# Patient Record
Sex: FEMALE | Race: WHITE | URBAN - METROPOLITAN AREA
[De-identification: names, ages, dates, MRNs, and addresses within clinical notes are randomized per-mention and may not be internally consistent; named-entity substitution may affect disease eponyms.]

---

## 2019-08-08 ENCOUNTER — TELEPHONE (OUTPATIENT)
Dept: INTERNAL MEDICINE CLINIC | Age: 19
End: 2019-08-08

## 2019-08-08 NOTE — TELEPHONE ENCOUNTER
PS[facility] called & set up new patient appointment with Dr. Kristofer Dial for Monday, November 25, 2019 11:00 AM. Patient was very thankful for call & appointment.

## 2019-11-25 ENCOUNTER — HOSPITAL ENCOUNTER (OUTPATIENT)
Dept: LAB | Age: 19
Discharge: HOME | End: 2019-11-25

## 2019-11-25 ENCOUNTER — OFFICE VISIT (OUTPATIENT)
Dept: INTERNAL MEDICINE CLINIC | Age: 19
End: 2019-11-25

## 2019-11-25 VITALS
HEIGHT: 60 IN | WEIGHT: 113 LBS | TEMPERATURE: 98.6 F | OXYGEN SATURATION: 98 % | RESPIRATION RATE: 16 BRPM | HEART RATE: 84 BPM | BODY MASS INDEX: 22.19 KG/M2 | SYSTOLIC BLOOD PRESSURE: 100 MMHG | DIASTOLIC BLOOD PRESSURE: 68 MMHG

## 2019-11-25 DIAGNOSIS — Z00.00 ROUTINE GENERAL MEDICAL EXAMINATION AT A HEALTH CARE FACILITY: Primary | ICD-10-CM

## 2019-11-25 DIAGNOSIS — Z00.00 ROUTINE GENERAL MEDICAL EXAMINATION AT A HEALTH CARE FACILITY: ICD-10-CM

## 2019-11-25 LAB
ALBUMIN SERPL-MCNC: 4.3 G/DL (ref 3.5–5)
ALBUMIN/GLOB SERPL: 1.2 {RATIO} (ref 1.1–2.2)
ALP SERPL-CCNC: 79 U/L (ref 45–117)
ALT SERPL-CCNC: 52 U/L (ref 12–78)
ANION GAP SERPL CALC-SCNC: 6 MMOL/L (ref 5–15)
AST SERPL-CCNC: 42 U/L (ref 15–37)
BILIRUB SERPL-MCNC: 0.6 MG/DL (ref 0.2–1)
BUN SERPL-MCNC: 11 MG/DL (ref 6–20)
BUN/CREAT SERPL: 13 (ref 12–20)
CALCIUM SERPL-MCNC: 9.3 MG/DL (ref 8.5–10.1)
CHLORIDE SERPL-SCNC: 105 MMOL/L (ref 97–108)
CHOLEST SERPL-MCNC: 259 MG/DL
CO2 SERPL-SCNC: 27 MMOL/L (ref 21–32)
CREAT SERPL-MCNC: 0.83 MG/DL (ref 0.55–1.02)
ERYTHROCYTE [DISTWIDTH] IN BLOOD BY AUTOMATED COUNT: 11.9 % (ref 11.5–14.5)
GLOBULIN SER CALC-MCNC: 3.6 G/DL (ref 2–4)
GLUCOSE SERPL-MCNC: 84 MG/DL (ref 65–100)
HCT VFR BLD AUTO: 45.5 % (ref 35–47)
HDLC SERPL-MCNC: 107 MG/DL
HDLC SERPL: 2.4 {RATIO} (ref 0–5)
HGB BLD-MCNC: 14.7 G/DL (ref 11.5–16)
LDLC SERPL CALC-MCNC: 127.4 MG/DL (ref 0–100)
LIPID PROFILE,FLP: ABNORMAL
MCH RBC QN AUTO: 29.9 PG (ref 26–34)
MCHC RBC AUTO-ENTMCNC: 32.3 G/DL (ref 30–36.5)
MCV RBC AUTO: 92.5 FL (ref 80–99)
NRBC # BLD: 0 K/UL (ref 0–0.01)
NRBC BLD-RTO: 0 PER 100 WBC
PLATELET # BLD AUTO: 265 K/UL (ref 150–400)
PMV BLD AUTO: 11.4 FL (ref 8.9–12.9)
POTASSIUM SERPL-SCNC: 4.1 MMOL/L (ref 3.5–5.1)
PROT SERPL-MCNC: 7.9 G/DL (ref 6.4–8.2)
RBC # BLD AUTO: 4.92 M/UL (ref 3.8–5.2)
SODIUM SERPL-SCNC: 138 MMOL/L (ref 136–145)
TRIGL SERPL-MCNC: 123 MG/DL (ref ?–150)
TSH SERPL DL<=0.05 MIU/L-ACNC: 0.56 UIU/ML (ref 0.36–3.74)
VLDLC SERPL CALC-MCNC: 24.6 MG/DL
WBC # BLD AUTO: 7 K/UL (ref 3.6–11)

## 2019-11-25 RX ORDER — ETHYNODIOL DIACETATE AND ETHINYL ESTRADIOL 1 MG-35MCG
KIT ORAL
COMMUNITY

## 2019-11-25 NOTE — PROGRESS NOTES
HISTORY OF PRESENT ILLNESS  June Horse is a 22 y.o. female. HPI     Two times a week has  - kickboxing ; occasionally will do a run     Her new major is ; she is doing well    She has recurrent strept and will make an appt to get her ENT ; she may think about it with next summer    Getting allergy shots at school     Review of Systems   Constitutional: Negative. Negative for chills, diaphoresis, fever, malaise/fatigue and weight loss. HENT: Negative for congestion, nosebleeds and tinnitus. Eyes: Negative for blurred vision, double vision and photophobia. Respiratory: Negative for cough, hemoptysis, sputum production, shortness of breath and wheezing. Cardiovascular: Negative for chest pain, palpitations, orthopnea, claudication, leg swelling and PND. Gastrointestinal: Negative for abdominal pain, blood in stool, constipation, diarrhea, heartburn, melena, nausea and vomiting. Genitourinary: Negative for dysuria, frequency, hematuria and urgency. Musculoskeletal: Negative for back pain, joint pain, myalgias and neck pain. Skin: Negative for itching and rash. Neurological: Negative for dizziness, tingling, sensory change, speech change, focal weakness, weakness and headaches. Endo/Heme/Allergies: Negative for polydipsia. Does not bruise/bleed easily. Psychiatric/Behavioral: Negative for depression. The patient is not nervous/anxious and does not have insomnia. Physical Exam  Vitals signs and nursing note reviewed. Constitutional:       Appearance: She is well-developed. HENT:      Head: Normocephalic and atraumatic. Right Ear: External ear normal.      Left Ear: External ear normal.      Nose: Nose normal.   Neck:      Musculoskeletal: Normal range of motion and neck supple. Thyroid: No thyroid mass or thyromegaly. Vascular: No carotid bruit or JVD. Cardiovascular:      Rate and Rhythm: Normal rate and regular rhythm. Pulses: Normal pulses. Heart sounds: Normal heart sounds, S1 normal and S2 normal. No murmur. No friction rub. No gallop. Pulmonary:      Effort: Pulmonary effort is normal.      Breath sounds: Normal breath sounds. Abdominal:      General: Bowel sounds are normal.      Palpations: Abdomen is soft. Musculoskeletal: Normal range of motion. Skin:     General: Skin is warm and dry. Neurological:      Mental Status: She is alert and oriented to person, place, and time. Psychiatric:         Behavior: Behavior normal.         Thought Content: Thought content normal.         Judgment: Judgment normal.         ASSESSMENT and PLAN  Diagnoses and all orders for this visit:    1. Routine general medical examination at a health care facility  -     CBC W/O DIFF; Future  -     LIPID PANEL; Future  -     METABOLIC PANEL, COMPREHENSIVE; Future  -     TSH 3RD GENERATION; Future    This note will not be viewable in [employer] [employer].         lab results and schedule of future lab studies reviewed with patient  reviewed diet, exercise and weight control  cardiovascular risk and specific lipid/LDL goals reviewed  reviewed medications and side effects in detail

## 2020-10-14 ENCOUNTER — TELEPHONE (OUTPATIENT)
Dept: INTERNAL MEDICINE CLINIC | Age: 20
End: 2020-10-14

## 2020-10-15 ENCOUNTER — OFFICE VISIT (OUTPATIENT)
Dept: INTERNAL MEDICINE CLINIC | Age: 20
End: 2020-10-15
Payer: COMMERCIAL

## 2020-10-15 VITALS
TEMPERATURE: 98.4 F | RESPIRATION RATE: 18 BRPM | DIASTOLIC BLOOD PRESSURE: 74 MMHG | WEIGHT: 114.2 LBS | SYSTOLIC BLOOD PRESSURE: 110 MMHG | HEART RATE: 72 BPM | BODY MASS INDEX: 22.3 KG/M2 | OXYGEN SATURATION: 100 %

## 2020-10-15 DIAGNOSIS — N39.0 URINARY TRACT INFECTION WITHOUT HEMATURIA, SITE UNSPECIFIED: Primary | ICD-10-CM

## 2020-10-15 LAB
BILIRUB UR QL STRIP: NEGATIVE
GLUCOSE UR-MCNC: NEGATIVE MG/DL
KETONES P FAST UR STRIP-MCNC: NEGATIVE MG/DL
PH UR STRIP: 6.5 [PH] (ref 4.6–8)
PROT UR QL STRIP: NEGATIVE
SP GR UR STRIP: 1.01 (ref 1–1.03)
UA UROBILINOGEN AMB POC: NORMAL (ref 0.2–1)
URINALYSIS CLARITY POC: NORMAL
URINALYSIS COLOR POC: NORMAL
URINE BLOOD POC: NEGATIVE
URINE LEUKOCYTES POC: NEGATIVE
URINE NITRITES POC: NEGATIVE

## 2020-10-15 PROCEDURE — 99214 OFFICE O/P EST MOD 30 MIN: CPT | Performed by: INTERNAL MEDICINE

## 2020-10-15 PROCEDURE — 81001 URINALYSIS AUTO W/SCOPE: CPT | Performed by: INTERNAL MEDICINE

## 2020-10-15 RX ORDER — CEPHALEXIN 250 MG/1
250 CAPSULE ORAL
COMMUNITY
Start: 2020-10-13

## 2020-10-15 NOTE — PROGRESS NOTES
HISTORY OF PRESENT ILLNESS  Aliyah Casiano is a 22 y.o. female. HPI  Pt reports that on Monday she woke up and had a UTI (hematuria). She notes that throughout the day she developed flu-like sxs and went to the [facility]. She then went to the ED due to concern that the infection had spread to her kidneys. Pt's CT did not show kidney stones. Pt was informed that she had a severe UTI and was treated with IV Keflex. UTI: Pt now continues on oral Keflex four times a day. She notes that she has some back pain today but is feeling much better. Pt states that she followed with GYN today and was informed that her cyst is not cause for concern. Pt states that she is sexually active. Of note, pt had COVID in the second week of school. Review of Systems   Gastrointestinal: Positive for abdominal pain (RLQ). Musculoskeletal: Positive for back pain. All other systems reviewed and are negative. Physical Exam  Constitutional:       Appearance: Normal appearance. HENT:      Right Ear: Hearing, tympanic membrane and external ear normal.      Left Ear: Hearing, tympanic membrane and external ear normal.      Mouth/Throat:      Mouth: Mucous membranes are moist.      Pharynx: Oropharynx is clear. Cardiovascular:      Rate and Rhythm: Normal rate and regular rhythm. Pulses: Normal pulses. Heart sounds: Normal heart sounds. Pulmonary:      Effort: Pulmonary effort is normal.      Breath sounds: Normal breath sounds and air entry. Abdominal:      Tenderness: There is abdominal tenderness (RLQ). Musculoskeletal: Normal range of motion. Skin:     General: Skin is warm and dry. Neurological:      General: No focal deficit present. Mental Status: She is alert and oriented to person, place, and time. Psychiatric:         Mood and Affect: Mood normal.         Behavior: Behavior normal.         ASSESSMENT and PLAN  Diagnoses and all orders for this visit:    1. Urinary tract infection without hematuria, site unspecified  Stable and improving. Asked pt to give urine sample to assess if there is still bacteria in her urinary tract. Pt agreed. Ordered urinalysis and waiting on culture results. Had conversation with pt about causes of UTI's and informed her that some people are just more prone to developing them. Pt states that her GYN Rx Macrobid for her to take if she feels like she is developing a UTI in the future. Will continue to monitor for improvements or changes. Lab results and schedule of future lab studies reviewed with patient. Reviewed diet, exercise and weight control. Written by Eddy Tellez, as dictated by Mello Huerta MD.     Current diagnosis and concerns discussed with pt at length. Understands risks and benefits or current treatment plan and medications and accepts the treatment and medication with any possible risks. Pt asks appropriate questions which were answered. Pt instructed to call with any concerns or problems.

## 2020-10-16 LAB
BACTERIA SPEC CULT: NORMAL
SERVICE CMNT-IMP: NORMAL

## 2021-11-02 NOTE — PROGRESS NOTES
Kala Lerner (: 2000) is a 23 y.o. female, established patient, here for evaluation of the following chief complaint(s):  Complete Physical       ASSESSMENT/PLAN:  Below is the assessment and plan developed based on review of pertinent history, physical exam, labs, studies, and medications. 1. 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 Physical Exam  Constitutional:       Appearance: Normal appearance. HENT:      Right Ear: Tympanic membrane and external ear normal.      Left Ear: Tympanic membrane and external ear normal.      Mouth/Throat:      Mouth: Mucous membranes are moist.      Pharynx: Oropharynx is clear. Cardiovascular:      Rate and Rhythm: Normal rate and regular rhythm. Pulses: Normal pulses. Heart sounds: Normal heart sounds. Pulmonary:      Effort: Pulmonary effort is normal.      Breath sounds: Normal breath sounds. Musculoskeletal:         General: Normal range of motion. Skin:     General: Skin is warm and dry. Neurological:      General: No focal deficit present. Mental Status: She is alert and oriented to person, place, and time.    Psychiatric:         Mood and Affect: Mood normal.         Behavior: Behavior normal.     On this date 2021 I have spent 30 minutes reviewing previous notes, test results and face to face with the patient discussing the diagnosis and importance of compliance with the treatment plan as well as documenting on the day of the visit. An electronic signature was used to authenticate this note.

## 2021-11-03 ENCOUNTER — OFFICE VISIT (OUTPATIENT)
Dept: INTERNAL MEDICINE CLINIC | Age: 21
End: 2021-11-03
Payer: COMMERCIAL

## 2021-11-03 VITALS
TEMPERATURE: 98.3 F | HEART RATE: 66 BPM | RESPIRATION RATE: 16 BRPM | SYSTOLIC BLOOD PRESSURE: 91 MMHG | OXYGEN SATURATION: 99 % | WEIGHT: 109.2 LBS | BODY MASS INDEX: 21.44 KG/M2 | DIASTOLIC BLOOD PRESSURE: 58 MMHG | HEIGHT: 60 IN

## 2021-11-03 DIAGNOSIS — Z00.00 ROUTINE GENERAL MEDICAL EXAMINATION AT A HEALTH CARE FACILITY: Primary | ICD-10-CM

## 2021-11-03 PROCEDURE — 99395 PREV VISIT EST AGE 18-39: CPT | Performed by: INTERNAL MEDICINE

## 2023-02-21 NOTE — PROGRESS NOTES
Myrene Meigs (: 2000) is a 23 y.o. female, established patient, here for evaluation of the following chief complaint(s):  No chief complaint on file. ASSESSMENT/PLAN:  Below is the assessment and plan developed based on review of pertinent history, physical exam, labs, studies, and medications. 1. Routine general medical examination at a health care facility    No follow-ups on file. Defer on labs this year and can do it next year   She does exercise and eating right , mood is good    SUBJECTIVE/OBJECTIVE:  HPI    Here for a physical. Feeling well and doing well; feeling well and has an IUD now and every 6-8 months her ovulation is bad and her abdominal pain gets worse   Eating healthy- joined [employer] pil[employer] in [place]   Saw  and got a Pap     Review of Systems   All other systems reviewed and are negative. Physical Exam  Vitals and nursing note reviewed. Constitutional:       Appearance: Normal appearance. She is well-developed. HENT:      Head: Normocephalic and atraumatic. Right Ear: External ear normal.      Left Ear: External ear normal.      Nose: Nose normal.   Eyes:      Conjunctiva/sclera: Conjunctivae normal.      Pupils: Pupils are equal, round, and reactive to light. Neck:      Thyroid: No thyromegaly. Vascular: No carotid bruit. Cardiovascular:      Rate and Rhythm: Normal rate and regular rhythm. Heart sounds: Normal heart sounds, S1 normal and S2 normal.   Pulmonary:      Effort: Pulmonary effort is normal.      Breath sounds: Normal breath sounds. Abdominal:      General: Bowel sounds are normal.      Palpations: Abdomen is soft. Tenderness: There is no abdominal tenderness. Musculoskeletal:         General: Normal range of motion. Cervical back: Normal range of motion and neck supple. Skin:     General: Skin is warm and dry. Neurological:      Mental Status: She is alert and oriented to person, place, and time. Psychiatric:         Behavior: Behavior normal.         Thought Content: Thought content normal.         Judgment: Judgment normal.         On this date 02/22/2023 I have spent 30 minutes reviewing previous notes, test results and face to face with the patient discussing the diagnosis and importance of compliance with the treatment plan as well as documenting on the day of the visit. An electronic signature was used to authenticate this note.   -- Araceli Ganser, MD

## 2023-02-22 ENCOUNTER — OFFICE VISIT (OUTPATIENT)
Dept: INTERNAL MEDICINE CLINIC | Age: 23
End: 2023-02-22
Payer: COMMERCIAL

## 2023-02-22 VITALS
HEART RATE: 68 BPM | WEIGHT: 108 LBS | RESPIRATION RATE: 16 BRPM | HEIGHT: 61 IN | TEMPERATURE: 98.2 F | OXYGEN SATURATION: 98 % | DIASTOLIC BLOOD PRESSURE: 67 MMHG | SYSTOLIC BLOOD PRESSURE: 97 MMHG | BODY MASS INDEX: 20.39 KG/M2

## 2023-02-22 DIAGNOSIS — Z00.00 ROUTINE GENERAL MEDICAL EXAMINATION AT A HEALTH CARE FACILITY: Primary | ICD-10-CM

## 2023-02-22 PROCEDURE — 99395 PREV VISIT EST AGE 18-39: CPT | Performed by: INTERNAL MEDICINE

## 2023-02-22 RX ORDER — SPIRONOLACTONE 25 MG/1
TABLET ORAL
COMMUNITY
Start: 2023-01-20

## 2023-05-24 RX ORDER — CEPHALEXIN 250 MG/1
250 CAPSULE ORAL
COMMUNITY
Start: 2020-10-13

## 2023-05-24 RX ORDER — ETHYNODIOL DIACETATE AND ETHINYL ESTRADIOL 1 MG-35MCG
KIT ORAL
COMMUNITY

## 2023-05-24 RX ORDER — SPIRONOLACTONE 25 MG/1
TABLET ORAL
COMMUNITY
Start: 2023-01-20

## 2023-10-19 ENCOUNTER — OFFICE VISIT (OUTPATIENT)
Age: 23
End: 2023-10-19
Payer: COMMERCIAL

## 2023-10-19 VITALS
WEIGHT: 107 LBS | HEIGHT: 60 IN | DIASTOLIC BLOOD PRESSURE: 64 MMHG | BODY MASS INDEX: 21.01 KG/M2 | RESPIRATION RATE: 16 BRPM | TEMPERATURE: 98 F | HEART RATE: 67 BPM | OXYGEN SATURATION: 98 % | SYSTOLIC BLOOD PRESSURE: 94 MMHG

## 2023-10-19 DIAGNOSIS — J02.8 PHARYNGITIS DUE TO OTHER ORGANISM: Primary | ICD-10-CM

## 2023-10-19 LAB
GROUP A STREP ANTIGEN, POC: NEGATIVE
VALID INTERNAL CONTROL, POC: NORMAL

## 2023-10-19 PROCEDURE — 99213 OFFICE O/P EST LOW 20 MIN: CPT | Performed by: INTERNAL MEDICINE

## 2023-10-19 PROCEDURE — 87880 STREP A ASSAY W/OPTIC: CPT | Performed by: INTERNAL MEDICINE

## 2023-10-19 RX ORDER — CEFUROXIME AXETIL 250 MG/1
250 TABLET ORAL 2 TIMES DAILY
Qty: 20 TABLET | Refills: 0 | Status: SHIPPED | OUTPATIENT
Start: 2023-10-19 | End: 2023-10-29

## 2023-10-19 SDOH — ECONOMIC STABILITY: FOOD INSECURITY: WITHIN THE PAST 12 MONTHS, THE FOOD YOU BOUGHT JUST DIDN'T LAST AND YOU DIDN'T HAVE MONEY TO GET MORE.: NEVER TRUE

## 2023-10-19 SDOH — ECONOMIC STABILITY: HOUSING INSECURITY
IN THE LAST 12 MONTHS, WAS THERE A TIME WHEN YOU DID NOT HAVE A STEADY PLACE TO SLEEP OR SLEPT IN A SHELTER (INCLUDING NOW)?: NO

## 2023-10-19 SDOH — ECONOMIC STABILITY: INCOME INSECURITY: HOW HARD IS IT FOR YOU TO PAY FOR THE VERY BASICS LIKE FOOD, HOUSING, MEDICAL CARE, AND HEATING?: NOT HARD AT ALL

## 2023-10-19 SDOH — ECONOMIC STABILITY: FOOD INSECURITY: WITHIN THE PAST 12 MONTHS, YOU WORRIED THAT YOUR FOOD WOULD RUN OUT BEFORE YOU GOT MONEY TO BUY MORE.: NEVER TRUE

## 2023-10-19 ASSESSMENT — PATIENT HEALTH QUESTIONNAIRE - PHQ9
SUM OF ALL RESPONSES TO PHQ QUESTIONS 1-9: 0
SUM OF ALL RESPONSES TO PHQ QUESTIONS 1-9: 0
2. FEELING DOWN, DEPRESSED OR HOPELESS: 0
SUM OF ALL RESPONSES TO PHQ QUESTIONS 1-9: 0
1. LITTLE INTEREST OR PLEASURE IN DOING THINGS: 0
SUM OF ALL RESPONSES TO PHQ9 QUESTIONS 1 & 2: 0
SUM OF ALL RESPONSES TO PHQ QUESTIONS 1-9: 0

## 2023-10-19 NOTE — PROGRESS NOTES
Gabriel Go (:  2000) is a 23 y.o. female,Established patient, here for evaluation of the following chief complaint(s):  Sore Throat (Went to urgent care on Monday when sx started. Pt was told that it was viral infection, took dayquil and nyquil, sx got worse. )         ASSESSMENT/PLAN:  1. Pharyngitis due to other organism  -     cefUROXime (CEFTIN) 250 MG tablet; Take 1 tablet by mouth 2 times daily for 10 days, Disp-20 tablet, R-0Normal  -     AMB POC RAPID STREP A    Strep negative but I am concerned given the degree of erythema and some anterior lymph nodes and some chills we will treat  No follow-ups on file. Subjective   SUBJECTIVE/OBJECTIVE:  HPI  Went to  had sore throat and negative- started Monday morning with sore throat and went to [employer] - had covid in August     Review of Systems   All other systems reviewed and are negative. Objective   Physical Exam  Vitals and nursing note reviewed. Constitutional:       Appearance: Normal appearance. HENT:      Head: Normocephalic and atraumatic. Comments: Erythema of tonsils, no posterior LAD, no exudate      Right Ear: Tympanic membrane and external ear normal.      Left Ear: Tympanic membrane and external ear normal.      Nose: Nose normal.      Mouth/Throat:      Mouth: Mucous membranes are moist.   Eyes:      Extraocular Movements: Extraocular movements intact. Conjunctiva/sclera: Conjunctivae normal.   Cardiovascular:      Rate and Rhythm: Normal rate and regular rhythm. Pulmonary:      Effort: Pulmonary effort is normal.      Breath sounds: Normal breath sounds. Abdominal:      General: Bowel sounds are normal.      Palpations: Abdomen is soft. Musculoskeletal:         General: Normal range of motion. Cervical back: Normal range of motion. Skin:     General: Skin is warm. Neurological:      General: No focal deficit present.       Mental Status: She is alert and oriented to person,

## 2024-08-08 ENCOUNTER — HOSPITAL ENCOUNTER (OUTPATIENT)
Facility: HOSPITAL | Age: 24
Discharge: HOME | End: 2024-08-08
Attending: INTERNAL MEDICINE
Payer: COMMERCIAL

## 2024-08-08 ENCOUNTER — OFFICE VISIT (OUTPATIENT)
Age: 24
End: 2024-08-08
Payer: COMMERCIAL

## 2024-08-08 VITALS
HEART RATE: 66 BPM | HEIGHT: 60 IN | TEMPERATURE: 98.4 F | RESPIRATION RATE: 16 BRPM | BODY MASS INDEX: 21.95 KG/M2 | OXYGEN SATURATION: 99 % | SYSTOLIC BLOOD PRESSURE: 112 MMHG | DIASTOLIC BLOOD PRESSURE: 78 MMHG | WEIGHT: 111.8 LBS

## 2024-08-08 DIAGNOSIS — R10.30 LOWER ABDOMINAL PAIN: ICD-10-CM

## 2024-08-08 DIAGNOSIS — K62.5 RECTAL BLEEDING: ICD-10-CM

## 2024-08-08 DIAGNOSIS — R19.5 INCREASED MUCUS IN STOOL: ICD-10-CM

## 2024-08-08 DIAGNOSIS — R10.30 LOWER ABDOMINAL PAIN: Primary | ICD-10-CM

## 2024-08-08 PROCEDURE — 99214 OFFICE O/P EST MOD 30 MIN: CPT | Performed by: INTERNAL MEDICINE

## 2024-08-08 PROCEDURE — 74177 CT ABD & PELVIS W/CONTRAST: CPT

## 2024-08-08 PROCEDURE — 6360000004 HC RX CONTRAST MEDICATION: Performed by: INTERNAL MEDICINE

## 2024-08-08 RX ORDER — CIPROFLOXACIN 500 MG/1
500 TABLET, FILM COATED ORAL 2 TIMES DAILY
Qty: 20 TABLET | Refills: 0 | Status: SHIPPED | OUTPATIENT
Start: 2024-08-08 | End: 2024-08-18

## 2024-08-08 RX ADMIN — IOPAMIDOL 100 ML: 755 INJECTION, SOLUTION INTRAVENOUS at 17:28

## 2024-08-08 ASSESSMENT — PATIENT HEALTH QUESTIONNAIRE - PHQ9
SUM OF ALL RESPONSES TO PHQ QUESTIONS 1-9: 0
2. FEELING DOWN, DEPRESSED OR HOPELESS: NOT AT ALL
SUM OF ALL RESPONSES TO PHQ9 QUESTIONS 1 & 2: 0
SUM OF ALL RESPONSES TO PHQ QUESTIONS 1-9: 0
1. LITTLE INTEREST OR PLEASURE IN DOING THINGS: NOT AT ALL

## 2024-08-08 NOTE — PROGRESS NOTES
Cervical back: Normal range of motion.   Skin:     General: Skin is warm.   Neurological:      General: No focal deficit present.      Mental Status: She is alert and oriented to person, place, and time. Mental status is at baseline.   Psychiatric:         Mood and Affect: Mood normal.         Behavior: Behavior normal.         Thought Content: Thought content normal.         Judgment: Judgment normal.                The patient (or guardian, if applicable) and other individuals in attendance with the patient were advised that Artificial Intelligence will be utilized during this visit to record, process the conversation to generate a clinical note, and support improvement of the AI technology. The patient (or guardian, if applicable) and other individuals in attendance at the appointment consented to the use of AI, including the recording.                 An electronic signature was used to authenticate this note.    --Lourdes Franks MD

## 2025-01-07 NOTE — PROGRESS NOTES
Rita Pizano (:  2000) is a 24 y.o. female,Established patient, here for evaluation of the following chief complaint(s):  Annual Exam (Patient is not fasting)     Diagnosis Orders   1. Encounter for general adult medical examination without abnormal findings  CBC    Lipid Panel    Comprehensive Metabolic Panel    TSH           Assessment & Plan  1. Annual examination.  A comprehensive blood work panel will be ordered to assess her overall health status.  Continue working on exercising making healthy lifestyle choices and getting her GYN care through the gynecologist       Subjective   HPI    History of Present Illness  The patient is a 23-year-old female who presents for an annual exam.    She reports a generally positive mood and has been engaging in physical activities such as hot yoga and barre, although she has not exercised in the past 2 weeks due to weather conditions and the [place] season. She recently transitioned from these activities to a [employer] membership .. She is currently using an intrauterine device (IUD) for contraception.    After last visit, She subsequently consulted with Dr. Fisher, a gastroenterologist, who diagnosed her with a fissure. She is currently on a compounded medication for this condition and has a follow-up appointment scheduled for next week. She reports no concerns related to her sinuses.    Supplemental Information  Her gynecological care is managed by [facility], where she had her annual examination in 2024.    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       Review of Systems   All other systems reviewed and are negative.         Objective   Physical Exam  Vitals and nursing note reviewed.   Constitutional:       Appearance: Normal appearance. She is normal weight.   HENT:      Head: Normocephalic and atraumatic.      Right Ear: Tympanic membrane and external ear normal.      Left Ear: Tympanic membrane and

## 2025-01-09 ENCOUNTER — OFFICE VISIT (OUTPATIENT)
Facility: CLINIC | Age: 25
End: 2025-01-09
Payer: COMMERCIAL

## 2025-01-09 VITALS
DIASTOLIC BLOOD PRESSURE: 61 MMHG | TEMPERATURE: 97.9 F | HEIGHT: 60 IN | SYSTOLIC BLOOD PRESSURE: 95 MMHG | OXYGEN SATURATION: 97 % | BODY MASS INDEX: 22.19 KG/M2 | RESPIRATION RATE: 16 BRPM | WEIGHT: 113 LBS | HEART RATE: 68 BPM

## 2025-01-09 DIAGNOSIS — Z00.00 ENCOUNTER FOR GENERAL ADULT MEDICAL EXAMINATION WITHOUT ABNORMAL FINDINGS: Primary | ICD-10-CM

## 2025-01-09 DIAGNOSIS — Z00.00 ENCOUNTER FOR GENERAL ADULT MEDICAL EXAMINATION WITHOUT ABNORMAL FINDINGS: ICD-10-CM

## 2025-01-09 LAB
ALBUMIN SERPL-MCNC: 4.2 G/DL (ref 3.5–5)
ALBUMIN/GLOB SERPL: 1.2 (ref 1.1–2.2)
ALP SERPL-CCNC: 75 U/L (ref 45–117)
ALT SERPL-CCNC: 24 U/L (ref 12–78)
ANION GAP SERPL CALC-SCNC: 5 MMOL/L (ref 2–12)
AST SERPL-CCNC: 17 U/L (ref 15–37)
BILIRUB SERPL-MCNC: 0.7 MG/DL (ref 0.2–1)
BUN SERPL-MCNC: 14 MG/DL (ref 6–20)
BUN/CREAT SERPL: 16 (ref 12–20)
CALCIUM SERPL-MCNC: 9.3 MG/DL (ref 8.5–10.1)
CHLORIDE SERPL-SCNC: 106 MMOL/L (ref 97–108)
CHOLEST SERPL-MCNC: 242 MG/DL
CO2 SERPL-SCNC: 26 MMOL/L (ref 21–32)
CREAT SERPL-MCNC: 0.9 MG/DL (ref 0.55–1.02)
ERYTHROCYTE [DISTWIDTH] IN BLOOD BY AUTOMATED COUNT: 11.4 % (ref 11.5–14.5)
GLOBULIN SER CALC-MCNC: 3.6 G/DL (ref 2–4)
GLUCOSE SERPL-MCNC: 88 MG/DL (ref 65–100)
HCT VFR BLD AUTO: 43.8 % (ref 35–47)
HDLC SERPL-MCNC: 99 MG/DL
HDLC SERPL: 2.4 (ref 0–5)
HGB BLD-MCNC: 14.9 G/DL (ref 11.5–16)
LDLC SERPL CALC-MCNC: 132.6 MG/DL (ref 0–100)
MCH RBC QN AUTO: 30.8 PG (ref 26–34)
MCHC RBC AUTO-ENTMCNC: 34 G/DL (ref 30–36.5)
MCV RBC AUTO: 90.7 FL (ref 80–99)
NRBC # BLD: 0 K/UL (ref 0–0.01)
NRBC BLD-RTO: 0 PER 100 WBC
PLATELET # BLD AUTO: 278 K/UL (ref 150–400)
PMV BLD AUTO: 11.1 FL (ref 8.9–12.9)
POTASSIUM SERPL-SCNC: 4.2 MMOL/L (ref 3.5–5.1)
PROT SERPL-MCNC: 7.8 G/DL (ref 6.4–8.2)
RBC # BLD AUTO: 4.83 M/UL (ref 3.8–5.2)
SODIUM SERPL-SCNC: 137 MMOL/L (ref 136–145)
TRIGL SERPL-MCNC: 52 MG/DL
TSH SERPL DL<=0.05 MIU/L-ACNC: 0.71 UIU/ML (ref 0.36–3.74)
VLDLC SERPL CALC-MCNC: 10.4 MG/DL
WBC # BLD AUTO: 6 K/UL (ref 3.6–11)

## 2025-01-09 PROCEDURE — 99395 PREV VISIT EST AGE 18-39: CPT | Performed by: INTERNAL MEDICINE

## 2025-01-09 SDOH — ECONOMIC STABILITY: FOOD INSECURITY: WITHIN THE PAST 12 MONTHS, THE FOOD YOU BOUGHT JUST DIDN'T LAST AND YOU DIDN'T HAVE MONEY TO GET MORE.: NEVER TRUE

## 2025-01-09 SDOH — ECONOMIC STABILITY: FOOD INSECURITY: WITHIN THE PAST 12 MONTHS, YOU WORRIED THAT YOUR FOOD WOULD RUN OUT BEFORE YOU GOT MONEY TO BUY MORE.: NEVER TRUE

## 2025-01-09 ASSESSMENT — PATIENT HEALTH QUESTIONNAIRE - PHQ9
SUM OF ALL RESPONSES TO PHQ QUESTIONS 1-9: 0
1. LITTLE INTEREST OR PLEASURE IN DOING THINGS: NOT AT ALL
SUM OF ALL RESPONSES TO PHQ QUESTIONS 1-9: 0
SUM OF ALL RESPONSES TO PHQ9 QUESTIONS 1 & 2: 0
SUM OF ALL RESPONSES TO PHQ QUESTIONS 1-9: 0
SUM OF ALL RESPONSES TO PHQ QUESTIONS 1-9: 0
2. FEELING DOWN, DEPRESSED OR HOPELESS: NOT AT ALL